# Patient Record
Sex: FEMALE | Race: WHITE
[De-identification: names, ages, dates, MRNs, and addresses within clinical notes are randomized per-mention and may not be internally consistent; named-entity substitution may affect disease eponyms.]

---

## 2018-01-31 ENCOUNTER — HOSPITAL ENCOUNTER (OUTPATIENT)
Dept: HOSPITAL 76 - DI | Age: 83
Discharge: HOME | End: 2018-01-31
Attending: FAMILY MEDICINE
Payer: MEDICARE

## 2018-01-31 DIAGNOSIS — Z87.828: ICD-10-CM

## 2018-01-31 DIAGNOSIS — H93.11: Primary | ICD-10-CM

## 2018-01-31 PROCEDURE — 70551 MRI BRAIN STEM W/O DYE: CPT

## 2018-01-31 NOTE — MRI REPORT
EXAM:

MRI BRAIN WITHOUT CONTRAST

 

EXAM DATE: 1/31/2018 01:07 PM.

 

CLINICAL HISTORY: 84-year-old woman with right-sided tinnitus and history of head injury.

 

COMPARISON: None.

 

TECHNIQUE: Multiplanar, multisequence T1-weighted and fluid-sensitive MR sequences of the brain were 
performed. Sequences optimized for routine evaluation. Other: High-resolution axial images were acqui
red through the internal auditory canals. IV Contrast: None.

 

FINDINGS:

Parenchyma: No evidence of acute infarct on diffusion weighted sequence. The parenchyma demonstrates 
mild burden of nonspecific FLAIR hyperintensities in the cerebral and periventricular white matter, m
ost consistent with sequelae of chronic small vessel ischemic disease and a common finding in this ag
e group.  

 

Pituitary: Unremarkable.

 

Ventricles and Extra-axial Spaces: Ventricles are symmetric and normal in size for age. Extra-axial s
paces are unremarkable. 

 

Internal auditory canals: Patent without mass lesion bilaterally. Cochleas and vestibular apparatuses
 demonstrate normal fluid signal intensity. No evidence of semicircular canal dehiscence.

 

Orbits: Unremarkable.

 

Sinuses: Minimal mucosal thickening is present in the paranasal sinuses. Mastoid air cells are clear.


 

Major Vascular Flow Voids: Intact. 

 

IMPRESSION: 

1. No acute intracranial abnormalities. Specifically, no evidence of acute infarct, hemorrhage, or ma
ss lesion. 

2. Internal auditory canals are patent without mass lesion. Cochleas and vestibular apparatuses are n
ormal in appearance. No evidence of semicircular canal dehiscence. 

3. Mild white matter changes, most consistent with sequelae of chronic small vessel ischemic disease 
and a common finding in this age group.

 

[facility]

Referring Provider Line: [phone]

 

SITE ID: [id number]

## 2020-10-15 ENCOUNTER — HOSPITAL ENCOUNTER (OUTPATIENT)
Dept: HOSPITAL 76 - EMS | Age: 85
Discharge: ANOTHER HOSPITAL | End: 2020-10-15
Attending: SURGERY
Payer: MEDICARE

## 2020-10-15 DIAGNOSIS — R06.02: ICD-10-CM

## 2020-10-15 DIAGNOSIS — R07.89: Primary | ICD-10-CM

## 2021-07-01 ENCOUNTER — HOSPITAL ENCOUNTER (EMERGENCY)
Dept: HOSPITAL 76 - ED | Age: 86
Discharge: HOME | End: 2021-07-01
Payer: MEDICARE

## 2021-07-01 VITALS — SYSTOLIC BLOOD PRESSURE: 127 MMHG | DIASTOLIC BLOOD PRESSURE: 104 MMHG

## 2021-07-01 DIAGNOSIS — R79.1: Primary | ICD-10-CM

## 2021-07-01 DIAGNOSIS — I48.91: ICD-10-CM

## 2021-07-01 DIAGNOSIS — Z79.01: ICD-10-CM

## 2021-07-01 DIAGNOSIS — T45.511A: ICD-10-CM

## 2021-07-01 LAB
ANION GAP SERPL CALCULATED.4IONS-SCNC: 10 MMOL/L (ref 6–13)
BASOPHILS NFR BLD AUTO: 0.1 10^3/UL (ref 0–0.1)
BASOPHILS NFR BLD AUTO: 0.8 %
BUN SERPL-MCNC: 31 MG/DL (ref 6–20)
CALCIUM UR-MCNC: 9.3 MG/DL (ref 8.5–10.3)
CHLORIDE SERPL-SCNC: 102 MMOL/L (ref 101–111)
CO2 SERPL-SCNC: 24 MMOL/L (ref 21–32)
CREAT SERPLBLD-SCNC: 0.7 MG/DL (ref 0.4–1)
EOSINOPHIL # BLD AUTO: 0.2 10^3/UL (ref 0–0.7)
EOSINOPHIL NFR BLD AUTO: 2.4 %
ERYTHROCYTE [DISTWIDTH] IN BLOOD BY AUTOMATED COUNT: 13.9 % (ref 12–15)
GFRSERPLBLD MDRD-ARVRAT: 79 ML/MIN/{1.73_M2} (ref 89–?)
GLUCOSE SERPL-MCNC: 99 MG/DL (ref 70–100)
HCT VFR BLD AUTO: 40.9 % (ref 37–47)
HGB UR QL STRIP: 14.5 G/DL (ref 12–16)
INR PPP: 8.9 (ref 0.8–1.2)
LYMPHOCYTES # SPEC AUTO: 1.8 10^3/UL (ref 1.5–3.5)
LYMPHOCYTES NFR BLD AUTO: 21 %
MCH RBC QN AUTO: 33 PG (ref 27–31)
MCHC RBC AUTO-ENTMCNC: 35.5 G/DL (ref 32–36)
MCV RBC AUTO: 93.2 FL (ref 81–99)
MONOCYTES # BLD AUTO: 0.9 10^3/UL (ref 0–1)
MONOCYTES NFR BLD AUTO: 10.1 %
NEUTROPHILS # BLD AUTO: 5.6 10^3/UL (ref 1.5–6.6)
NEUTROPHILS # SNV AUTO: 8.5 X10^3/UL (ref 4.8–10.8)
NEUTROPHILS NFR BLD AUTO: 65.5 %
NRBC # BLD AUTO: 0 /100WBC
NRBC # BLD AUTO: 0 X10^3/UL
PDW BLD AUTO: 10.9 FL (ref 7.9–10.8)
PLATELET # BLD: 236 10^3/UL (ref 130–450)
POTASSIUM SERPL-SCNC: 4.1 MMOL/L (ref 3.5–5)
PROTHROM ACT/NOR PPP: 86.6 SECS (ref 9.9–12.6)
RBC MAR: 4.39 10^6/UL (ref 4.2–5.4)
SODIUM SERPLBLD-SCNC: 136 MMOL/L (ref 135–145)

## 2021-07-01 PROCEDURE — 85025 COMPLETE CBC W/AUTO DIFF WBC: CPT

## 2021-07-01 PROCEDURE — 99283 EMERGENCY DEPT VISIT LOW MDM: CPT

## 2021-07-01 PROCEDURE — 85610 PROTHROMBIN TIME: CPT

## 2021-07-01 PROCEDURE — 80048 BASIC METABOLIC PNL TOTAL CA: CPT

## 2021-07-01 PROCEDURE — 36415 COLL VENOUS BLD VENIPUNCTURE: CPT

## 2021-07-01 NOTE — ED PHYSICIAN DOCUMENTATION
History of Present Illness





- Stated complaint


Stated Complaint: ABDNORMAL LAB WORK





- Chief complaint


Chief Complaint: General





- Additonal information


Additional information: 


Patient with a history of atrial fibrillation on Coumadin was sent to the 

emergency department for vitamin K shot as her INR was reportedly 7.8.  She 

denies chest pain or shortness of air.  No headache.  No melena or hematochezia.





Patient's  reports that they misunderstood the doctors instructions and 

were giving her 2 doses of Coumadin a day instead of her typical tablet.  They 

do not know the dose of Coumadin








PD PAST MEDICAL HISTORY





- Past Medical History


Past Medical History: Yes


Cardiovascular: None, Atrial fibrillation


Respiratory: None


HEENT: Macular degeneration





- Past Surgical History


Past Surgical History: No





- Present Medications


Home Medications: 


                                Ambulatory Orders











 Medication  Instructions  Recorded  Confirmed


 


Latanoprost 1 drops EACHEYE QPM 10/18/20 10/18/20


 


Amiodarone [Pacerone] 200 mg PO DAILY #30 tablet 10/22/20 


 


Apixaban [Eliquis] 2.5 mg PO BID #60 tablet 10/22/20 


 


Ciprofloxacin [Cipro] 250 mg PO Q12H #4 tablet 10/22/20 


 


Digoxin [Lanoxin] 250 mcg PO SUTUWETHSA #20 tablet 10/22/20 


 


Metoprolol Succinate [Toprol Xl] 100 mg PO BID #60 tablet 10/22/20 


 


Spironolactone [Aldactone] 25 mg PO DAILY #60 tablet 10/22/20 


 


lisinopriL [Lisinopril] 2.5 mg PO DAILY #30 tablet 10/22/20 














- Allergies


Allergies/Adverse Reactions: 


                                    Allergies











Allergy/AdvReac Type Severity Reaction Status Date / Time


 


No Known Drug Allergies Allergy   Verified 07/01/21 17:12














- Social History


Does the pt smoke?: No


Smoking Status: Never smoker


Does the pt drink ETOH?: Yes


Does the pt have substance abuse?: No





- Immunizations


Immunizations are current?: Yes





- POLST


Patient has POLST: No





PD ED PE NORMAL





- General


General: Alert and oriented X 3, No acute distress, Well developed/nourished





- Neck


Neck: Supple, no meningeal sign, No adenopathy





- Cardiac


Cardiac: No murmur, Strong equal pulses.  No: RRR (Irregular)





- Respiratory


Respiratory: No respiratory distress, Clear bilaterally





- Abdomen


Abdomen: Normal bowel sounds, Non tender





- Back


Back: No CVA TTP, No spinal TTP





- Derm


Derm: Normal color, Warm and dry, No rash, Other (No ecchymosis bleeding or 

petechiae)





- Extremities


Extremities: No deformity





- Neuro


Neuro: CNs 2-12 intact


Eye Opening: Spontaneous


Motor: Obeys Commands


Verbal: Oriented


GCS Score: 15





Results





- Vitals


Vitals: 


                               Vital Signs - 24 hr











  07/01/21





  17:12


 


Temperature 36.5 C


 


Heart Rate 94


 


Respiratory 16





Rate 


 


Blood Pressure 126/83 H


 


O2 Saturation 96








                                     Oxygen











O2 Source                      Room air

















- Labs


Labs: 


                                Laboratory Tests











  07/01/21 07/01/21 07/01/21





  19:21 19:21 19:21


 


WBC   8.5 


 


RBC   4.39 


 


Hgb   14.5 


 


Hct   40.9 


 


MCV   93.2 


 


MCH   33.0 H 


 


MCHC   35.5 


 


RDW   13.9 


 


Plt Count   236 


 


MPV   10.9 H 


 


Neut # (Auto)   5.6 


 


Lymph # (Auto)   1.8 


 


Mono # (Auto)   0.9 


 


Eos # (Auto)   0.2 


 


Baso # (Auto)   0.1 


 


Absolute Nucleated RBC   0.00 


 


Nucleated RBC %   0.0 


 


PT  86.6 H  


 


INR  8.9 H*  


 


Sodium    136


 


Potassium    4.1


 


Chloride    102


 


Carbon Dioxide    24


 


Anion Gap    10.0


 


BUN    31 H


 


Creatinine    0.7


 


Estimated GFR (MDRD)    79 L


 


Glucose    99


 


Calcium    9.3














PD MEDICAL DECISION MAKING





- ED course


Complexity details: reviewed results, re-evaluated patient, d/w patient, d/w 

family


ED course: 





87-year-old female presents emergency department for evaluation of a superficial

 therapeutic INR when she had been accidentally taking 2 warfarin tablets a day 

for the last 3 weeks instead of her typical 1.  Her INR today is 8.9 though 

there are no signs of active bleeding. She last took the Coumadin this a.m.





Patient is advised to hold her Coumadin until otherwise notified.  She was given

 2.5 mg of vitamin K orally here in the emergency department.  I have asked her 

to return to the emergency department on Saturday, July 3 in the afternoon to 

have her INR rechecked.  At that time her  will come with the Coumadin so

 that we are able to understand what dosing she is taking. We did do a screening

 CBC and chemistry which was unremarkable for age. no anemia.





Emergent return precautions were discussed for concerns of bleeding.





Departure





- Departure


Disposition: 01 Home, Self Care


Clinical Impression: 


 Supratherapeutic INR





Condition: Stable


Record reviewed to determine appropriate education?: Yes


Comments: 


[facility] do not take any further Coumadin/warfarin.  Please return to the 

emergency department Saturday afternoon so that we may recheck your INR.  Please

 bring your Coumadin with you so that we can understand what dose you take.





If at any point you develop fevers, have bloody or black bowel movements, have 

uncontrolled bleeding, fall or strike your head you are to come immediately to 

the ER

## 2021-07-03 ENCOUNTER — HOSPITAL ENCOUNTER (EMERGENCY)
Dept: HOSPITAL 76 - ED | Age: 86
Discharge: HOME | End: 2021-07-03
Payer: MEDICARE

## 2021-07-03 VITALS — SYSTOLIC BLOOD PRESSURE: 115 MMHG | DIASTOLIC BLOOD PRESSURE: 81 MMHG

## 2021-07-03 DIAGNOSIS — I48.91: ICD-10-CM

## 2021-07-03 DIAGNOSIS — Z79.01: ICD-10-CM

## 2021-07-03 DIAGNOSIS — R79.1: Primary | ICD-10-CM

## 2021-07-03 PROCEDURE — 85610 PROTHROMBIN TIME: CPT

## 2021-07-03 PROCEDURE — 99283 EMERGENCY DEPT VISIT LOW MDM: CPT

## 2021-07-03 NOTE — ED PHYSICIAN DOCUMENTATION
History of Present Illness





- Stated complaint


Stated Complaint: ABNORMAL LAB WORK





- Chief complaint


Chief Complaint: General





- Additonal information


Additional information: 


87-year-old female return to the emergency department for an INR recheck.  She 

was seen by me about 48 hours ago.  She had mistakenly been taking 2 Coumadin 

pills a day for about 3 weeks when she had an INR check at her doctor's office 

on Thursday.  He noted an INR greater than 7 advised her to come to the ER.  She

was 8.9 here.  I advised her to stop the Coumadin and she was given 2.5 mg of 

vitamin K orally.  At that time she did not know the dose of Coumadin that she 

was taking I asked her to return to the ER today for a recheck.  Since discharge

she has not had any abdominal pain, melena headache bruising or bleeding in her 

gums or unexplained bleeding.





Patient brought her Coumadin bottle here.  She takes Coumadin for the history of

atrial fibrillation.  The instructions are to take 3 mg every day.  Apparently 

patient had been taking 2 tablets a day for 3 weeks which is 6 mg daily.








Review of Systems


Constitutional: reports: Reviewed and negative


Eyes: reports: Reviewed and negative


Ears: reports: Reviewed and negative


Nose: reports: Reviewed and negative


Throat: reports: Reviewed and negative


Cardiac: reports: Reviewed and negative


Respiratory: reports: Reviewed and negative


GI: reports: Reviewed and negative


: reports: Reviewed and negative





PD PAST MEDICAL HISTORY





- Past Medical History


Cardiovascular: None, Atrial fibrillation


Respiratory: None


HEENT: Macular degeneration





- Past Surgical History


Past Surgical History: No





- Present Medications


Home Medications: 


                                Ambulatory Orders











 Medication  Instructions  Recorded  Confirmed


 


Latanoprost 1 drops EACHEYE QPM 10/18/20 10/18/20


 


Amiodarone [Pacerone] 200 mg PO DAILY #30 tablet 10/22/20 


 


Apixaban [Eliquis] 2.5 mg PO BID #60 tablet 10/22/20 


 


Ciprofloxacin [Cipro] 250 mg PO Q12H #4 tablet 10/22/20 


 


Digoxin [Lanoxin] 250 mcg PO SUTUWETHSA #20 tablet 10/22/20 


 


Metoprolol Succinate [Toprol Xl] 100 mg PO BID #60 tablet 10/22/20 


 


Spironolactone [Aldactone] 25 mg PO DAILY #60 tablet 10/22/20 


 


lisinopriL [Lisinopril] 2.5 mg PO DAILY #30 tablet 10/22/20 














- Allergies


Allergies/Adverse Reactions: 


                                    Allergies











Allergy/AdvReac Type Severity Reaction Status Date / Time


 


No Known Drug Allergies Allergy   Verified 07/03/21 13:21














- Social History


Does the pt smoke?: No


Smoking Status: Never smoker


Does the pt drink ETOH?: Yes


Does the pt have substance abuse?: No





- Immunizations


Immunizations are current?: Yes





- POLST


Patient has POLST: No





PD ED PE NORMAL





- General


General: Alert and oriented X 3, No acute distress





- HEENT


HEENT: PERRL





- Neck


Neck: Supple, no meningeal sign





- Cardiac


Cardiac: No: RRR (irregular)





- Respiratory


Respiratory: Clear bilaterally





- Abdomen


Abdomen: Normal bowel sounds, Soft, Non tender, Non distended





- Derm


Derm: Normal color, Warm and dry, No rash.  No: Other (No petechiae bruising or 

bleeding)





Results





- Vitals


Vitals: 


                               Vital Signs - 24 hr











  07/03/21





  13:17


 


Temperature 36.6 C


 


Heart Rate 68


 


Respiratory 20





Rate 


 


Blood Pressure 115/81 H


 


O2 Saturation 98








                                     Oxygen











O2 Source                      Room air

















- Labs


Labs: 


                                Laboratory Tests











  07/03/21





  14:46


 


INR (Fingerstick)  1.5 H














PD MEDICAL DECISION MAKING





- ED course


Complexity details: reviewed results, re-evaluated patient, d/w patient


ED course: 


87-year-old female presents to the emergency department for evaluation of her 

INR.  Seen 48 hours ago noted to have an INR of 8.9.  She is advised to hold her

 Coumadin which she had been mistakenly taking at 6 mg daily instead of the 

prescribed 3 mg a day.  She was also given 2.5 mg of vitamin K orally.





On recheck today her INR is 1.5.  Patient is advised to resume taking her 

Coumadin as directed 3 mg daily.  Continue follow-up with primary care doctor 

for an INR check next week.  Emergent return precautions discussed for concerns 

of bleeding or any other emergent concerns.





Departure





- Departure


Disposition: 01 Home, Self Care


Clinical Impression: 


 Abnormal INR





Condition: Stable


Record reviewed to determine appropriate education?: Yes


Comments: 


Your INR today is 1.5.





Please begin taking the Coumadin daily in the evening.  Take 1 3 mg tablet each 

day.  Please follow-up with your primary care doctor for an INR check later next

 week.